# Patient Record
Sex: FEMALE | Race: WHITE
[De-identification: names, ages, dates, MRNs, and addresses within clinical notes are randomized per-mention and may not be internally consistent; named-entity substitution may affect disease eponyms.]

---

## 2020-02-26 ENCOUNTER — HOSPITAL ENCOUNTER (EMERGENCY)
Dept: HOSPITAL 25 - UCEAST | Age: 70
Discharge: HOME | End: 2020-02-26
Payer: MEDICARE

## 2020-02-26 VITALS — SYSTOLIC BLOOD PRESSURE: 165 MMHG | DIASTOLIC BLOOD PRESSURE: 87 MMHG

## 2020-02-26 DIAGNOSIS — H66.92: Primary | ICD-10-CM

## 2020-02-26 PROCEDURE — G0463 HOSPITAL OUTPT CLINIC VISIT: HCPCS

## 2020-02-26 PROCEDURE — 99212 OFFICE O/P EST SF 10 MIN: CPT

## 2020-02-26 NOTE — XMS REPORT
Continuity of Care Document (CCD)

 Created on:2020



Patient:Corina Au

Sex:Female

:1950

External Reference #:[id number]





Demographics







 Address  [address] [place] [address]

 

 Home Phone  [phone]

 

 Preferred Language  en

 

 Marital Status  Not  or 

 

 Buddhism Affiliation  Unknown

 

 Race  White

 

 Ethnic Group  Not  or 









Author







 Name  Shiv Sauer M.D.

 

 Address  [address] [place] [id number]









Problems







 Description

 

 No Information Available







Social History







 Type  Date  Description  Comments

 

 Birth Sex    Unknown  

 

 Cigarette Use    Does Not Smoke Cigarettes  

 

 ETOH Use    Denies alcohol use  

 

 Recreational Drug Use    Denies Drug Use  

 

 Exercise Type/Frequency    Exercises sporadically  

 

 Seat Belt/Car Seat    Always uses seat belt  







Allergies, Adverse Reactions, Alerts







 Description

 

 No Known Drug Allergies







Medications







 Active Medications  SIG  Qnty  Indications  Ordering Provider  Date

 

 Metformin HCL  1 by mouth twice      Unknown  



           1000mg  a day        



 Tablets          



           

 

 Rosuvastatin Calcium        Unknown  



                  5mg          



 Tablets          



           

 

 Lisinopril  1 by mouth every      Unknown  



        10mg Tablets  day        



           







Medications Administered in Office







 Medication  SIG  Qnty  Indications  Ordering Provider  Date

 

 PT SCRN Tbco Id as Non User        Shiv Sauer M.D.  2020



                   Injection          



           







Immunizations







 Description

 

 No Information Available







Vital Signs







 Date  Vital  Result  Comment

 

 2020  9:50am  BP Systolic  158 mmHg  









 BP Diastolic  82 mmHg  

 

 Height  64.5 inches  5'4.50"

 

 Weight  132.00 lb  

 

 BMI (Body Mass Index)  22.3 kg/m2  

 

 Last Menstrual Period  7815576  

 

   3  

 

 Parity  3  







Results







 Description

 

 No Information Available







Procedures







 Date  Code  Description  Status

 

 2020  97594  Fitting & Insertion Of Pessary/Intravaginal Support  
Completed



     Device  

 

 2019  [id number]  Mammogram  Completed

 

 2015  18779863  Colonoscopy  Completed







Medical Devices







 Description

 

 No Information Available







Encounters







 Type  Date  Location  Provider  Dx  Diagnosis

 

 Office Visit  2020  [facility]  Shiv Sauer,  N81.11  Cystocele, 
midline



   10:00a    [facility]    







Assessments







 Date  Code  Description  Provider

 

 2020  N81.11  Cystocele, midline  Shiv Sauer M.D.







Plan of Treatment

Future Appointment(s):2020 10:00 am - Shiv Sauer M.D. at [facility]



Functional Status







 Description

 

 No Information Available







Mental Status







 Description

 

 No Information Available







Referrals







 Description

 

 No Information Available

## 2020-02-26 NOTE — UC
Ear Complaint HPI





- HPI Summary


HPI Summary: 


Patient is a 68yo female presenting with left earache 2 days.  Patient states 

she is getting over a recent cold.  Denies decreased hearing. Denies drainage 

from the ear. Denies fever.  Taking Aleve for pain relief.








- History of Current Complaint


Chief Complaint: UCEar


Stated Complaint: EAR PAIN


Hx Obtained From: Patient


Pain Intensity: 8


Pain Scale Used: 0-10 Numeric





- Allergies/Home Medications


Allergies/Adverse Reactions: 


 Allergies











Allergy/AdvReac Type Severity Reaction Status Date / Time


 


No Known Allergies Allergy   Verified 02/26/20 09:23











Home Medications: 


 Home Medications





lisinopriL [Lisinopril 2.5 MG-] 10 mg PO DAILY 10/02/12 [History Confirmed 02/26 /20]


Amoxicillin/Clavulanate TAB* [Augmentin *] 875 mg PO BID #10 tab 02/26/ 20 [Rx]


Rosuvastatin Calcium [Crestor] 5 mg PO EVERY OTHER DAY 02/26/20 [History 

Confirmed 02/26/20]


metFORMIN* [Glucophage 1000 MG TAB *] 1,000 mg PO BID 02/26/20 [History 

Confirmed 02/26/20]











PMH/Surg Hx/FS Hx/Imm Hx


Previously Healthy: Yes





- Surgical History


Surgical History: Yes


Surgery Procedure, Year, and Place: [facility]- BILATERAL 6 YRS AGO AND PINS 

REMOVED





- Family History


Known Family History: Positive: Non-Contributory





- Social History


Alcohol Use: Rare


Substance Use Type: None


Smoking Status (MU): Never Smoked Tobacco





Review of Systems


All Other Systems Reviewed And Are Negative: Yes


Constitutional: Positive: Negative


ENT: Positive: Ear Ache - left


Respiratory: Positive: Negative


Cardiovascular: Positive: Negative


Gastrointestinal: Positive: Negative


Neurological/Mental Status: Positive: Negative





Physical Exam





- Summary


Physical Exam Summary: 


Vital Signs Reviewed: Yes


A+Ox3, no distress


Eyes: Conjunctiva Clear


ENT: Hearing grossly normal, erythema and bulging of left TM, right TM clear, 

moist, uvula midline, no exudate, no erythema


Neck: Positive: Supple


Respiratory: Positive: No respiratory distress, No accessory muscle use + CTA 

throughout  no w/r


Cardiovascular: RRR  nl s1, s2  no m/r


Musculoskeletal Exam: [place] x 4 without difficulty


Neurological: Positive: Alert


Psychological: Positive: age appropriate behavior


Skin: Positive: no rash, no ecchymosis








Vital Signs: 


 Initial Vital Signs











Temp  99 F   02/26/20 09:25


 


Pulse  103   02/26/20 09:25


 


Resp  18   02/26/20 09:25


 


BP  165/87   02/26/20 09:25


 


Pulse Ox  99   02/26/20 09:25














Ear Complaint Course/Dx





- Course


Course Of Treatment: 


Patient with acute otitis media of left ear.  I treated the patient with 

Augmentin and instructed to follow up with PCP if symptoms persist.  Patient 

was understanding and agreed with treatment plan.








- Differential Dx/Diagnosis


Provider Diagnosis: 


 Acute otitis media, left








Discharge ED





- Sign-Out/Discharge


Documenting (check all that apply): Patient Departure


All imaging exams completed and their final reports reviewed: No Studies





- Discharge Plan


Condition: Stable


Disposition: HOME


Prescriptions: 


Amoxicillin/Clavulanate TAB* [Augmentin *] 875 mg PO BID #10 tab


Patient Education Materials:  Ear Infection (ED)


Referrals: 


Rose Marie Calles MD [Primary Care Provider] - If Needed


Additional Instructions: 


Take Augmentin for treatment of your ear infection.


Continue with aleve as directed for pain relief.


Follow up with your primary care provider if symptoms do not resolve within 7 

days.





- Billing Disposition and Condition


Condition: STABLE


Disposition: Home

## 2020-02-26 NOTE — XMS REPORT
Continuity of Care Document (CCD)

 Created on:2020



Patient:Corina Au

Sex:Female

:1950

External Reference #:[id number]





Demographics







 Address  [address] [place] [address]

 

 Home Phone  [phone]

 

 Email Address  [email or web address]

 

 Preferred Language  en

 

 Marital Status  Not  or 

 

 Tenriism Affiliation  Unknown

 

 Race  White

 

 Ethnic Group  Not  or 









Author







 Name  Shiv Sauer M.D.

 

 Address  [address] [place] [id number]









Problems







 Active Problems  Provider  Date

 

 Midline cystocele  Shiv Sauer M.D.  Onset: 2020







Social History







 Type  Date  Description  Comments

 

 Birth Sex    Unknown  

 

 Cigarette Use    Does Not Smoke Cigarettes  

 

 ETOH Use    Denies alcohol use  

 

 Recreational Drug Use    Denies Drug Use  

 

 Tobacco Use  Start: Unknown  Patient has never smoked  

 

 Smoking Status  Reviewed: 20  Patient has never smoked  

 

 Exercise Type/Frequency    Exercises sporadically  

 

 Seat Belt/Car Seat    Always uses seat belt  







Allergies, Adverse Reactions, Alerts







 Description

 

 No Known Drug Allergies







Medications







 Active Medications  SIG  Qnty  Indications  Ordering Provider  Date

 

 Metformin HCL  1 by mouth twice      Unknown  



           1000mg  a day        



 Tablets          



           

 

 Rosuvastatin Calcium        Unknown  



                  5mg          



 Tablets          



           

 

 Lisinopril  1 by mouth every      Unknown  



        10mg Tablets  day        



           







Medications Administered in Office







 Medication  SIG  Qnty  Indications  Ordering Provider  Date

 

 PT SCRN Tbco Id as Non User        Shiv Sauer M.D.  2020



                   Injection          



           







Immunizations







 Description

 

 No Information Available







Vital Signs







 Date  Vital  Result  Comment

 

 2020 10:15am  BP Systolic  152 mmHg  









 BP Diastolic  80 mmHg  

 

 Height  64.5 inches  5'4.50"

 

 Weight  132.00 lb  

 

 BMI (Body Mass Index)  22.3 kg/m2  

 

 Last Menstrual Period  7184331  

 

   3  

 

 Parity  3  









 2020  9:50am  BP Systolic  158 mmHg  









 BP Diastolic  82 mmHg  

 

 Height  64.5 inches  5'4.50"

 

 Weight  132.00 lb  

 

 BMI (Body Mass Index)  22.3 kg/m2  

 

 Last Menstrual Period  7009658  

 

   3  

 

 Parity  3  







Results







 Description

 

 No Information Available







Procedures







 Date  Code  Description  Status

 

 2020  32370  Fitting & Insertion Of Pessary/Intravaginal Support  
Completed



     Device  

 

 2019  [id number]  Mammogram  Completed

 

 2015  95925223  Colonoscopy  Completed







Medical Devices







 Description

 

 No Information Available







Encounters







 Type  Date  Location  Provider  Dx  Diagnosis

 

 Office Visit  2020  [facility]  Shiv Sauer,  N81.11  Cystocele, 
midline



   10:00a    [facility]    









 N81.9  Female genital prolapse, unspecified







Assessments







 Date  Code  Description  Provider

 

 2020  N81.11  Cystocele, midline  Shiv Sauer M.D.

 

 2020  N81.11  Cystocele, midline  Shiv Sauer M.D.

 

 2020  N81.9  Female genital prolapse, unspecified  Shiv Sauer M.D.







Plan of Treatment

2020 - Shiv Sauer M.D.N81.11 Cystocele, midlineComments:discussed 
surgery again. pt demonstrated ability to remove and replace it with good 
placement . trimosan prescribed.Follow up:1 year



Functional Status







 Description

 

 No Information Available







Mental Status







 Description

 

 No Information Available







Referrals







 Description

 

 No Information Available